# Patient Record
Sex: FEMALE | Race: WHITE | URBAN - METROPOLITAN AREA
[De-identification: names, ages, dates, MRNs, and addresses within clinical notes are randomized per-mention and may not be internally consistent; named-entity substitution may affect disease eponyms.]

---

## 2023-08-31 ENCOUNTER — MED REC SCAN ONLY (OUTPATIENT)
Dept: OBGYN CLINIC | Facility: CLINIC | Age: 33
End: 2023-08-31

## 2024-01-03 ENCOUNTER — HOSPITAL ENCOUNTER (EMERGENCY)
Facility: HOSPITAL | Age: 34
Discharge: HOME | End: 2024-01-03
Attending: STUDENT IN AN ORGANIZED HEALTH CARE EDUCATION/TRAINING PROGRAM
Payer: COMMERCIAL

## 2024-01-03 VITALS
SYSTOLIC BLOOD PRESSURE: 113 MMHG | HEART RATE: 51 BPM | HEIGHT: 61 IN | BODY MASS INDEX: 32.1 KG/M2 | OXYGEN SATURATION: 99 % | TEMPERATURE: 99 F | RESPIRATION RATE: 14 BRPM | WEIGHT: 170 LBS | DIASTOLIC BLOOD PRESSURE: 65 MMHG

## 2024-01-03 DIAGNOSIS — K64.9 HEMORRHOIDS, UNSPECIFIED HEMORRHOID TYPE: Primary | ICD-10-CM

## 2024-01-03 PROCEDURE — 99283 EMERGENCY DEPT VISIT LOW MDM: CPT

## 2024-01-03 RX ORDER — ACETAMINOPHEN 500 MG
1000 TABLET ORAL ONCE
Status: COMPLETED | OUTPATIENT
Start: 2024-01-03 | End: 2024-01-03

## 2024-01-03 RX ORDER — IBUPROFEN 600 MG/1
600 TABLET ORAL ONCE
Status: COMPLETED | OUTPATIENT
Start: 2024-01-03 | End: 2024-01-03

## 2024-01-03 RX ORDER — HYDROCORTISONE ACETATE 25 MG/1
25 SUPPOSITORY RECTAL 2 TIMES DAILY PRN
Qty: 12 SUPPOSITORY | Refills: 0 | Status: SHIPPED | OUTPATIENT
Start: 2024-01-03 | End: 2024-02-02

## 2024-01-04 NOTE — ED PROVIDER NOTES
Patient Seen in: [facility]      History     Chief Complaint   Patient presents with    Anal Problem     Stated Complaint: thinks she has hemrrhoids    Subjective:   HPI    Patient is a 33-year-old female presented to the emergency room with concerns for hemorrhoids.  She states that she has dealt with constipation over the last few days.  She also did some heavy lifting several days ago.  She started feeling intense pain in her rectal area and noticed some blood on the tissue paper.    Objective:   History reviewed. No pertinent past medical history.           History reviewed. No pertinent surgical history.             Social History     Socioeconomic History    Marital status: Legally    Tobacco Use    Smoking status: Some Days     Types: Cigarettes    Smokeless tobacco: Never   Vaping Use    Vaping Use: Never used   Substance and Sexual Activity    Alcohol use: Never    Drug use: Never              Review of Systems    Positive for stated complaint: thinks she has hemrrhoids  Other systems are as noted in HPI.  Constitutional and vital signs reviewed.      All other systems reviewed and negative except as noted above.    Physical Exam     ED Triage Vitals [01/03/24 1858]   /85   Pulse 78   Resp 18   Temp 98.7 °F (37.1 °C)   Temp src Temporal   SpO2 98 %   O2 Device None (Room air)       Current:/65   Pulse 51   Temp 98.7 °F (37.1 °C) (Temporal)   Resp 14   Ht 154.9 cm (5' 1\")   Wt 77.1 kg   LMP 01/01/2024 (Exact Date)   SpO2 99%   BMI 32.12 kg/m²         Physical Exam  Vitals and nursing note reviewed. Exam conducted with a chaperone present.   Constitutional:       General: She is not in acute distress.     Appearance: Normal appearance.   HENT:      Head: Normocephalic.      Nose: Nose normal.      Mouth/Throat:      Mouth: Mucous membranes are moist.   Eyes:      Extraocular Movements: Extraocular movements intact.      Pupils: Pupils are equal, round,  and reactive to light.   Cardiovascular:      Rate and Rhythm: Normal rate and regular rhythm.      Pulses: Normal pulses.   Pulmonary:      Effort: Pulmonary effort is normal.   Abdominal:      General: Abdomen is flat. Bowel sounds are normal. There is no distension.      Palpations: Abdomen is soft.      Tenderness: There is no abdominal tenderness. There is no right CVA tenderness, left CVA tenderness, guarding or rebound.      Hernia: No hernia is present.   Genitourinary:     Comments: There is a hemorrhoid present that does not appear thrombosed.   it is tender to the touch  Musculoskeletal:         General: No swelling or tenderness. Normal range of motion.      Cervical back: Normal range of motion.   Skin:     General: Skin is warm and dry.   Neurological:      Mental Status: She is alert and oriented to person, place, and time. Mental status is at baseline.   Psychiatric:         Mood and Affect: Mood normal.               ED Course   Labs Reviewed - No data to display          MDM      The differential includes the following  Internal hemorrhoids, external hemorrhoids thrombosed hemorrhoids    Pertinent comorbidities include as listed above    Pertinent social history includes: History of constipation and recent heavy lifting    ER course  PaPatient is afebrile hemodynamically stable here.  Rectal exam shows there is an external hemorrhoid that is tender to touch but does not appear thrombosed.  Long discussion held with the patient regarding the need for increased fiber in her diet but also the need for stool softeners.  Patient discharged with Anusol with recommendation for sitz bath's as well.            Medical Decision Making      Disposition and Plan     Clinical Impression:  1. Hemorrhoids, unspecified hemorrhoid type         Disposition:  Discharge  1/3/2024 10:47 pm    Follow-up:  Jose Sanchez MD  [address] [facility] [address]  [phone]    Follow up            Medications  Prescribed:  Discharge Medication List as of 1/3/2024 10:51 PM        START taking these medications    Details   hydrocortisone 25 MG Rectal Suppos Place 1 suppository (25 mg total) rectally 2 (two) times daily as needed for Hemorrhoids., Normal, Disp-12 suppository, R-0

## (undated) NOTE — LETTER
Date & Time: 1/3/2024, 10:56 PM  Patient: Lana Melo  Encounter Provider(s):    Judy Farrell MD       To Whom It May Concern:    Lana Melo was seen and treated in our department on 1/3/2024. She should not return to work until 1/5/2024 .    If you have any questions or concerns, please do not hesitate to call.        _____________________________  Physician/APC Signature